# Patient Record
Sex: MALE | Race: WHITE | Employment: OTHER | URBAN - METROPOLITAN AREA
[De-identification: names, ages, dates, MRNs, and addresses within clinical notes are randomized per-mention and may not be internally consistent; named-entity substitution may affect disease eponyms.]

---

## 2019-05-10 ENCOUNTER — HOSPITAL ENCOUNTER (EMERGENCY)
Age: 54
Discharge: HOME | End: 2019-05-10
Attending: EMERGENCY MEDICINE

## 2019-05-10 ENCOUNTER — APPOINTMENT (OUTPATIENT)
Dept: ULTRASOUND IMAGING | Age: 54
End: 2019-05-10

## 2019-05-10 VITALS
TEMPERATURE: 97.9 F | WEIGHT: 190 LBS | RESPIRATION RATE: 18 BRPM | SYSTOLIC BLOOD PRESSURE: 137 MMHG | BODY MASS INDEX: 28.14 KG/M2 | OXYGEN SATURATION: 95 % | HEIGHT: 69 IN | DIASTOLIC BLOOD PRESSURE: 77 MMHG | HEART RATE: 79 BPM

## 2019-05-10 DIAGNOSIS — N45.2 ORCHITIS: Primary | ICD-10-CM

## 2019-05-10 DIAGNOSIS — N39.0 URINARY TRACT INFECTION WITHOUT HEMATURIA, SITE UNSPECIFIED: ICD-10-CM

## 2019-05-10 LAB
BACTERIA: ABNORMAL /HPF
BILIRUBIN URINE: NEGATIVE MG/DL
BLOOD, URINE: ABNORMAL
CLARITY: ABNORMAL
COLOR: YELLOW
GLUCOSE, URINE: NEGATIVE MG/DL
KETONES, URINE: NEGATIVE MG/DL
LEUKOCYTE ESTERASE, URINE: ABNORMAL
MUCUS: ABNORMAL HPF
NITRITE URINE, QUANTITATIVE: NEGATIVE
PH, URINE: 6 (ref 5–8)
PROTEIN UA: NEGATIVE MG/DL
RBC URINE: 10 /HPF (ref 0–3)
SPECIFIC GRAVITY UA: 1.01 (ref 1–1.03)
SPERM: ABNORMAL /HFP
SQUAMOUS EPITHELIAL: 2 /HPF
TRICHOMONAS: ABNORMAL /HPF
UROBILINOGEN, URINE: NORMAL MG/DL (ref 0.2–1)
WBC UA: 103 /HPF (ref 0–2)

## 2019-05-10 PROCEDURE — 76870 US EXAM SCROTUM: CPT

## 2019-05-10 PROCEDURE — 93975 VASCULAR STUDY: CPT

## 2019-05-10 PROCEDURE — 99284 EMERGENCY DEPT VISIT MOD MDM: CPT

## 2019-05-10 PROCEDURE — 81001 URINALYSIS AUTO W/SCOPE: CPT

## 2019-05-10 RX ORDER — LEVOFLOXACIN 500 MG/1
500 TABLET, FILM COATED ORAL DAILY
Qty: 14 TABLET | Refills: 0 | Status: SHIPPED | OUTPATIENT
Start: 2019-05-10 | End: 2019-05-24

## 2019-05-10 SDOH — HEALTH STABILITY: MENTAL HEALTH: HOW OFTEN DO YOU HAVE A DRINK CONTAINING ALCOHOL?: NEVER

## 2019-05-10 ASSESSMENT — PAIN DESCRIPTION - ORIENTATION: ORIENTATION: LEFT

## 2019-05-10 ASSESSMENT — PAIN DESCRIPTION - LOCATION: LOCATION: SCROTUM

## 2019-05-10 ASSESSMENT — PAIN SCALES - GENERAL: PAINLEVEL_OUTOF10: 8

## 2019-05-10 ASSESSMENT — PAIN DESCRIPTION - PAIN TYPE: TYPE: ACUTE PAIN

## 2019-05-10 NOTE — ED PROVIDER NOTES
Triage Chief Complaint:   Testicle Swelling (left sided testicle pain, states scrotum has doubled in size since Wed. with LLQ pain)    VEGA Chapa is a 62 y.o. male that presents For left testicle pain gradual in onset over the last few days is swollen, scrotum is swollen as well but he has no redness  Warmth or crepitus in this area. He has some dysuria, no history of sexually transmitted diseases or concern for them. He has no discharge. No rashes. He is not diabetic, no abdominal complaints. He's been taking sporadic over-the-counter medication with no improvement. No fevers    ROS:  General:  No fevers, no chills  Eyes:  No recent vison changes, no discharge  ENT:  No sore throat, no nasal congestion, no hearing changes  Cardiovascular:  No chest pain, no palpitations  Respiratory:  No shortness of breath, no cough, no wheezing  Gastrointestinal:  No pain, no nausea, no vomiting, no diarrhea  Musculoskeletal:  No muscle pain, no joint pain  Skin:  No rash, no pruritis, no easy bruising  Neurologic:  No speech problems, no headache  Genitourinary:  No dysuria, no hematuria, + testicular pain  Endocrine:  No unexpected weight gain, no unexpected weight loss  Extremities:  no edema, no pain    History reviewed. No pertinent past medical history. Past Surgical History:   Procedure Laterality Date    APPENDECTOMY      CHOLECYSTECTOMY      CORNEAL TRANSPLANT       History reviewed. No pertinent family history.   Social History     Socioeconomic History    Marital status: Single     Spouse name: Not on file    Number of children: Not on file    Years of education: Not on file    Highest education level: Not on file   Occupational History    Not on file   Social Needs    Financial resource strain: Not on file    Food insecurity:     Worry: Not on file     Inability: Not on file    Transportation needs:     Medical: Not on file     Non-medical: Not on file   Tobacco Use    Smoking status: Current Every Day Smoker    Smokeless tobacco: Never Used   Substance and Sexual Activity    Alcohol use: Never     Frequency: Never    Drug use: Never    Sexual activity: Yes     Partners: Female   Lifestyle    Physical activity:     Days per week: Not on file     Minutes per session: Not on file    Stress: Not on file   Relationships    Social connections:     Talks on phone: Not on file     Gets together: Not on file     Attends Zoroastrian service: Not on file     Active member of club or organization: Not on file     Attends meetings of clubs or organizations: Not on file     Relationship status: Not on file    Intimate partner violence:     Fear of current or ex partner: Not on file     Emotionally abused: Not on file     Physically abused: Not on file     Forced sexual activity: Not on file   Other Topics Concern    Not on file   Social History Narrative    Not on file     No current facility-administered medications for this encounter. Current Outpatient Medications   Medication Sig Dispense Refill    levofloxacin (LEVAQUIN) 500 MG tablet Take 1 tablet by mouth daily for 14 days 14 tablet 0     No Known Allergies    Nursing Notes Reviewed    Physical Exam:  ED Triage Vitals   Enc Vitals Group      BP 05/10/19 1335 (!) 182/87      Pulse 05/10/19 1335 83      Resp 05/10/19 1521 18      Temp 05/10/19 1335 97.9 °F (36.6 °C)      Temp Source 05/10/19 1335 Oral      SpO2 05/10/19 1335 98 %      Weight 05/10/19 1335 190 lb (86.2 kg)      Height 05/10/19 1335 5' 9\" (1.753 m)      Head Circumference --       Peak Flow --       Pain Score --       Pain Loc --       Pain Edu? --       Excl. in [address]? --        My pulse ox interpretation is - normal    General appearance:  No acute distress. Skin:  Warm. Dry. Eye:  Extraocular movements intact. Ears, nose, mouth and throat:  Oral mucosa moist   Neck:  Trachea midline.    Extremity: Normal ROM     Heart:  Regular  Perfusion:  intact  Respiratory: Testicles    Result Date: 5/10/2019  EXAMINATION: ULTRASOUND OF THE SCROTUM/TESTICLES WITH COLOR DOPPLER FLOW EVALUATION; DOPPLER EVALUATION OF THE PELVIS 5/10/2019 COMPARISON: None. HISTORY: ORDERING SYSTEM PROVIDED HISTORY: swelling TECHNOLOGIST PROVIDED HISTORY: Ordering Physician Provided Reason for Exam: left scrotal swelling Acuity: Acute Type of Exam: Initial Additional signs and symptoms: pain and redness Relevant Medical/Surgical History: nk; ORDERING SYSTEM PROVIDED HISTORY: FINDINGS: Measurements: Right testicle: 4.0 x 2.4 x 1.9 cm Left testicle: 3.4 x 2.6 x 2.1 cm Right: Grey scale: The right testicle demonstrates normal homogeneous echotexture without focal lesion. No evidence of testicular microlithiasis. Doppler Evaluation:  There is normal arterial and venous Doppler flow within the testicle. Scrotal Sac:  Small hydrocele. Epididymis:  No acute abnormality. Left: Grey scale: The left testicle demonstrates normal homogeneous echotexture without focal lesion. No evidence of testicular microlithiasis. Doppler Evaluation:  There is increased arterial and venous Doppler flow within the testicle and epididymis. Scrotal Sac: Moderate complex hydrocele present. Hydrocele. Epididymis:  Moderate diffuse enlargement with increased flow. Moderate left epididymal orchitis. Diffuse enlargement of the epididymis may indicate an underlying chronic epididymitis. Moderate complex left hydrocele. Us Dup Abd Pel Retro Scrot Complete    Result Date: 5/10/2019  EXAMINATION: ULTRASOUND OF THE SCROTUM/TESTICLES WITH COLOR DOPPLER FLOW EVALUATION; DOPPLER EVALUATION OF THE PELVIS 5/10/2019 COMPARISON: None.  HISTORY: ORDERING SYSTEM PROVIDED HISTORY: swelling TECHNOLOGIST PROVIDED HISTORY: Ordering Physician Provided Reason for Exam: left scrotal swelling Acuity: Acute Type of Exam: Initial Additional signs and symptoms: pain and redness Relevant Medical/Surgical History: nk; ORDERING SYSTEM PROVIDED HISTORY: FINDINGS: Measurements: Right testicle: 4.0 x 2.4 x 1.9 cm Left testicle: 3.4 x 2.6 x 2.1 cm Right: Grey scale: The right testicle demonstrates normal homogeneous echotexture without focal lesion. No evidence of testicular microlithiasis. Doppler Evaluation:  There is normal arterial and venous Doppler flow within the testicle. Scrotal Sac:  Small hydrocele. Epididymis:  No acute abnormality. Left: Grey scale: The left testicle demonstrates normal homogeneous echotexture without focal lesion. No evidence of testicular microlithiasis. Doppler Evaluation:  There is increased arterial and venous Doppler flow within the testicle and epididymis. Scrotal Sac: Moderate complex hydrocele present. Hydrocele. Epididymis:  Moderate diffuse enlargement with increased flow. Moderate left epididymal orchitis. Diffuse enlargement of the epididymis may indicate an underlying chronic epididymitis. Moderate complex left hydrocele. MDM:  Patient here for positive testicular swelling,Ultrasound and urine obtained, ultrasound shows orchitis and possible epididymitis, no evidence of torsion,  He does not have concern for sexually transmitted diseases, urine is positive for UTI. I'm going to start him on Levaquin, there is no concern for Johnathon's gangrene at this time, patient educated and will be discharged to monitor treat symptomatically and follow-up as an outpatient he understands and agrees return warnings given. Clinical Impression:  1. Orchitis    2.  Urinary tract infection without hematuria, site unspecified      Disposition referral (if applicable):  [phone]    In 2 days      Disposition medications (if applicable):  New Prescriptions    LEVOFLOXACIN (LEVAQUIN) 500 MG TABLET    Take 1 tablet by mouth daily for 14 days       Comment: Please note this report has been produced using speech recognition software and may contain errors related to that system including errors in grammar, punctuation, and spelling, as well as words and phrases that may be inappropriate. If there are any questions or concerns please feel free to contact the dictating provider for clarification.         Librado Royal MD  05/10/19 3811

## 2022-03-11 ENCOUNTER — OFFICE VISIT (OUTPATIENT)
Dept: INTERNAL MEDICINE CLINIC | Age: 57
End: 2022-03-11

## 2022-03-11 DIAGNOSIS — J01.40 ACUTE NON-RECURRENT PANSINUSITIS: ICD-10-CM

## 2022-03-11 DIAGNOSIS — J40 BRONCHITIS: Primary | ICD-10-CM

## 2022-03-11 PROCEDURE — 99213 OFFICE O/P EST LOW 20 MIN: CPT | Performed by: NURSE PRACTITIONER

## 2022-03-11 RX ORDER — AMOXICILLIN AND CLAVULANATE POTASSIUM 875; 125 MG/1; MG/1
1 TABLET, FILM COATED ORAL 2 TIMES DAILY
Qty: 20 TABLET | Refills: 0 | Status: SHIPPED | OUTPATIENT
Start: 2022-03-11 | End: 2022-03-21

## 2022-03-11 RX ORDER — METHYLPREDNISOLONE 4 MG/1
TABLET ORAL
Qty: 1 KIT | Refills: 0 | Status: SHIPPED | OUTPATIENT
Start: 2022-03-11 | End: 2022-03-17

## 2022-03-11 NOTE — PROGRESS NOTES
3/11/22  Carlos Eduardo MAXINE Lizcedric  1965    FLU/COVID-19 CLINIC EVALUATION    HPI SYMPTOMS:    Employer: [employer]    [x] Fevers  [x] Chills  [x] Cough  [] Coughing up blood  [x] Chest Congestion  [] Nasal Congestion  [x] Feeling short of breath  [x] Sometimes  [] Frequently  [] All the time  [] Chest pain  [x] Headaches  [x]Tolerable  [] Severe  [x] Sore throat  [x] Muscle aches  [] Nausea  [] Vomiting  []Unable to keep fluids down  [] Diarrhea  []Severe    [x] OTHER SYMPTOMS:  Loss of smell, fatigue  Symptom Duration:   [] 1  [] 2   [] 3   [x] 4    [] 5   [] 6   [] 7   [] 8   [] 9   [] 10   [] 11   [] 12   [] 13   [] 14   [] Longer than 14 days    Symptom course:   [] Worsening     [] Stable     [x] Improving    RISK FACTORS:    [] Pregnant or possibly pregnant  [] Age over 61  [] Diabetes  [] Heart disease  [x] Asthma  [] COPD/Other chronic lung diseases  [] Active Cancer  [] On Chemotherapy  [] Taking oral steroids  [] History Lymphoma/Leukemia  [] Close contact with a lab confirmed COVID-19 patient within 14 days of symptom onset  [] History of travel from affected geographical areas within 14 days of symptom onset       VITALS:  There were no vitals filed for this visit. TESTS:    POCT FLU:  [] Positive     []Negative    ASSESSMENT:    [] Flu  [] Possible COVID-19  [] Strep    PLAN:    [] Discharge home with written instructions for:  [] Flu management  [] Possible COVID-19 infection with self-quarantine and management of symptoms  [] Follow-up with primary care physician or emergency department if worsens  [] Evaluation per physician/NP/PA in clinic  [] Sent to ER       An  electronic signature was used to authenticate this note.      --Anais Saucedo MA on 3/11/2022 at 1:56 PM

## 2022-03-11 NOTE — PATIENT INSTRUCTIONS
Your COVID 19 test can take 1-5 days for the results to come back. We ask that you make a Mychart page and view your test results this way. You will need to Self quarantine until you know your results. If positive, please work on contact tracing. Increase fluids and rest  Saline nasal spray as needed for nasal congestion  Warm salt gargles as needed for throat discomfort  Monitor temperature twice a day  Tylenol as needed for fevers and/or discomfort. Big deep breaths periodically throughout the day  Regular Mucinex over the counter as needed for chest congestion  If symptoms worsen -Go to the ER. Follow up with your primary care provider      To Whom it May Concern: Luisa Rae was tested for COVID-19 3/11/2022. He/she must stay home until test results are back. If test is positive, isolate for a total of 5 days, starting from day 1 of symptom onset. After 5 days, if fever-free for 24 hours and there has been a gradual improvement in symptoms, may come out of isolation, but must consistently wear a mask when around other people for 5 additional days. (5 days isolation, 5 days mask-wearing). We do not recommend retesting as patients may continue to test positive for months even though no longer contagious. It is suggested you call [address] or [address] with any questions regarding isolation timeframe/return to work/school details.

## 2022-03-11 NOTE — PROGRESS NOTES
3/12/22    Chief Complaint   Patient presents with    Concern For 260Denzel Claudio, (1965), is a 60 y.o. male, is here for evaluation of the following medical concerns:    HPI    He is being seen today through the [facility]. PCP:  Dr. Debbie Akers    He with c/o 7 day hx of fever, chills, chest and nasal congestion with yellow/green sputum/mucus, intermittent wheezing, headache, sore throat, muscle aches. Covid vaccine x's 3.   Smoker 0.5 ppd  Denies hx of COPD    Review of Systems    Prior to Visit Medications    Not on File        No Known Allergies    No past medical history on file. Past Surgical History:   Procedure Laterality Date    APPENDECTOMY      CHOLECYSTECTOMY      CORNEAL TRANSPLANT         Social History     Tobacco Use    Smoking status: Current Every Day Smoker    Smokeless tobacco: Never Used   Substance Use Topics    Alcohol use: Never    Drug use: Never       No family history on file. No results found for: WBC, HGB, HCT, MCV, PLT  No results found for: LABA1C  No results found for: TSHFT4, TSH  No results found for: CHOL, TRIG, HDL, LDLCHOLESTEROL, LDLCALC, LABVLDL, VLDL, CHOLHDLRATIO      Results for orders placed or performed in visit on 03/11/22   COVID-19   Result Value Ref Range    SARS-CoV-2 Not Detected Not detected       Wt Readings from Last 3 Encounters:   05/10/19 190 lb (86.2 kg)     . FLOWAMB[6   BP Readings from Last 3 Encounters:   05/10/19 137/77     Pulse Readings from Last 3 Encounters:   05/10/19 79        Physical Exam  Constitutional:       General: He is not in acute distress. Appearance: Normal appearance. He is well-developed. He is not ill-appearing, toxic-appearing or diaphoretic. HENT:      Head: Normocephalic and atraumatic. Right Ear: Tympanic membrane, ear canal and external ear normal.      Left Ear: Tympanic membrane, ear canal and external ear normal.      Nose: Congestion present.       Right Sinus: Maxillary sinus tenderness and frontal sinus tenderness present. Left Sinus: Maxillary sinus tenderness and frontal sinus tenderness present. Mouth/Throat:      Mouth: Mucous membranes are moist.      Pharynx: No oropharyngeal exudate or posterior oropharyngeal erythema. Eyes:      Extraocular Movements: Extraocular movements intact. Conjunctiva/sclera: Conjunctivae normal.      Pupils: Pupils are equal, round, and reactive to light. Neck:      Thyroid: No thyroid mass or thyromegaly. Trachea: Trachea normal.   Cardiovascular:      Rate and Rhythm: Normal rate and regular rhythm. Pulses: Normal pulses. Heart sounds: S1 normal and S2 normal.   Pulmonary:      Effort: Pulmonary effort is normal. No accessory muscle usage or respiratory distress. Breath sounds: No stridor. Wheezing present. No rhonchi or rales. Musculoskeletal:         General: Normal range of motion. Right shoulder: No swelling, deformity, tenderness, bony tenderness or crepitus. Normal range of motion. Cervical back: Full passive range of motion without pain, normal range of motion and neck supple. No rigidity. No muscular tenderness. Lymphadenopathy:      Cervical: No cervical adenopathy. Skin:     General: Skin is warm and dry. Coloration: Skin is not pale. Findings: No erythema or rash. Nails: There is no clubbing. Neurological:      Mental Status: He is alert and oriented to person, place, and time. Psychiatric:         Mood and Affect: Mood normal.         Speech: Speech normal.         Behavior: Behavior normal.         Thought Content: Thought content normal.         Judgment: Judgment normal.       Pulse 79   Temp 97.9 °F (36.6 °C) (Infrared)   Resp 16   Ht 5' 9\" (1.753 m)   Wt 205 lb (93 kg)   SpO2 98%   BMI 30.27 kg/m²      ASSESSMENT AND PLAN:    1. Bronchitis  Long hx of smoking. Denies hx of COPD. Scattered wheezing noted. Oxygenating well. No distress.  Will treat with steroids. RTC or PCP if no improvement, or seek emergency care for worsening condition. Verb understanding.   - methylPREDNISolone (MEDROL DOSEPACK) 4 MG tablet; Take by mouth. Dispense: 1 kit; Refill: 0  - Covid-19 Ambulatory; Future  - COVID-19    2. Acute non-recurrent pansinusitis  Antibiotics as instructed. - Encourage clear fluids without caffeine to ensure hydration.  - Smaller, more frequent meals    - Saline nasal spray, cool mist humidifier, prop head at night for congestion.   - Tylenol as needed for fever, pain. - Counseled on signs of increased work of breathing to seek emergency treatment  - Follow-up with PCP as needed. - amoxicillin-clavulanate (AUGMENTIN) 875-125 MG per tablet; Take 1 tablet by mouth 2 times daily for 10 days  Dispense: 20 tablet; Refill: 0  - methylPREDNISolone (MEDROL DOSEPACK) 4 MG tablet; Take by mouth. Dispense: 1 kit; Refill: 0        Return if symptoms worsen or fail to improve.     TATUM Goldberg - CNP

## 2022-03-12 VITALS
HEIGHT: 69 IN | HEART RATE: 79 BPM | WEIGHT: 205 LBS | BODY MASS INDEX: 30.36 KG/M2 | OXYGEN SATURATION: 98 % | TEMPERATURE: 97.9 F | RESPIRATION RATE: 16 BRPM

## 2022-03-12 LAB — SARS-COV-2: NOT DETECTED

## 2025-08-29 ENCOUNTER — HOSPITAL ENCOUNTER (OUTPATIENT)
Dept: RADIATION ONCOLOGY | Age: 60
Discharge: HOME | End: 2025-08-29
Payer: COMMERCIAL

## 2025-08-29 VITALS
SYSTOLIC BLOOD PRESSURE: 149 MMHG | HEIGHT: 70 IN | OXYGEN SATURATION: 97 % | BODY MASS INDEX: 28.92 KG/M2 | DIASTOLIC BLOOD PRESSURE: 70 MMHG | WEIGHT: 202 LBS | TEMPERATURE: 97.5 F | HEART RATE: 70 BPM

## 2025-08-29 DIAGNOSIS — C61 MALIGNANT NEOPLASM OF PROSTATE (HCC): Primary | ICD-10-CM

## 2025-08-29 PROCEDURE — 99202 OFFICE O/P NEW SF 15 MIN: CPT | Performed by: RADIOLOGY

## 2025-08-29 RX ORDER — ATENOLOL AND CHLORTHALIDONE TABLET 50; 25 MG/1; MG/1
TABLET ORAL
COMMUNITY
Start: 2025-05-22

## 2025-08-29 RX ORDER — LOSARTAN POTASSIUM 50 MG/1
TABLET ORAL
COMMUNITY
Start: 2025-08-16

## 2025-08-29 RX ORDER — TAMSULOSIN HYDROCHLORIDE 0.4 MG/1
CAPSULE ORAL
COMMUNITY
Start: 2025-08-16

## 2025-09-05 ENCOUNTER — INITIAL CONSULT (OUTPATIENT)
Dept: ONCOLOGY | Age: 60
End: 2025-09-05
Payer: COMMERCIAL

## 2025-09-05 ENCOUNTER — HOSPITAL ENCOUNTER (OUTPATIENT)
Dept: INFUSION THERAPY | Age: 60
Discharge: HOME | End: 2025-09-05
Payer: COMMERCIAL

## 2025-09-05 ENCOUNTER — HOSPITAL ENCOUNTER (OUTPATIENT)
Dept: RADIATION ONCOLOGY | Age: 60
Discharge: HOME | End: 2025-09-05
Payer: COMMERCIAL

## 2025-09-05 VITALS
OXYGEN SATURATION: 96 % | SYSTOLIC BLOOD PRESSURE: 201 MMHG | HEART RATE: 59 BPM | WEIGHT: 203.8 LBS | TEMPERATURE: 97.7 F | HEIGHT: 70 IN | BODY MASS INDEX: 29.18 KG/M2 | DIASTOLIC BLOOD PRESSURE: 84 MMHG

## 2025-09-05 DIAGNOSIS — C61 PROSTATE CANCER (HCC): ICD-10-CM

## 2025-09-05 DIAGNOSIS — C61 PROSTATE CANCER (HCC): Primary | ICD-10-CM

## 2025-09-05 LAB
ALBUMIN SERPL-MCNC: 4.3 G/DL (ref 3.4–5)
ALBUMIN/GLOB SERPL: 1.3 {RATIO} (ref 1.1–2.2)
ALP SERPL-CCNC: 72 U/L (ref 40–129)
ALT SERPL-CCNC: 51 U/L (ref 10–40)
ANION GAP SERPL CALCULATED.3IONS-SCNC: 13 MMOL/L (ref 9–17)
AST SERPL-CCNC: 38 U/L (ref 15–37)
BASOPHILS # BLD: 0.03 K/UL
BASOPHILS NFR BLD: 0 % (ref 0–1)
BILIRUB SERPL-MCNC: 0.6 MG/DL (ref 0–1)
BUN SERPL-MCNC: 12 MG/DL (ref 7–20)
CALCIUM SERPL-MCNC: 9.8 MG/DL (ref 8.3–10.6)
CHLORIDE SERPL-SCNC: 89 MMOL/L (ref 99–110)
CHOLEST SERPL-MCNC: 186 MG/DL (ref 125–199)
CO2 SERPL-SCNC: 29 MMOL/L (ref 21–32)
CREAT SERPL-MCNC: 1 MG/DL (ref 0.8–1.3)
EOSINOPHIL # BLD: 0.22 K/UL
EOSINOPHILS RELATIVE PERCENT: 3 % (ref 0–3)
ERYTHROCYTE [DISTWIDTH] IN BLOOD BY AUTOMATED COUNT: 13.1 % (ref 11.7–14.9)
GFR, ESTIMATED: 75 ML/MIN/1.73M2
GLUCOSE SERPL-MCNC: 304 MG/DL (ref 74–99)
HCT VFR BLD AUTO: 44.6 % (ref 42–52)
HDLC SERPL-MCNC: 29 MG/DL
HGB BLD-MCNC: 15.3 G/DL (ref 13.5–18)
LDLC SERPL CALC-MCNC: 101 MG/DL
LYMPHOCYTES NFR BLD: 2.4 K/UL
LYMPHOCYTES RELATIVE PERCENT: 28 % (ref 24–44)
MAGNESIUM SERPL-MCNC: 1.7 MG/DL (ref 1.8–2.4)
MCH RBC QN AUTO: 29.7 PG (ref 27–31)
MCHC RBC AUTO-ENTMCNC: 34.3 G/DL (ref 32–36)
MCV RBC AUTO: 86.6 FL (ref 78–100)
MONOCYTES NFR BLD: 0.75 K/UL
MONOCYTES NFR BLD: 9 % (ref 0–5)
NEUTROPHILS NFR BLD: 61 % (ref 36–66)
NEUTS SEG NFR BLD: 5.23 K/UL
PLATELET # BLD AUTO: 240 K/UL (ref 140–440)
PMV BLD AUTO: 9.3 FL (ref 7.5–11.1)
POTASSIUM SERPL-SCNC: 3.2 MMOL/L (ref 3.5–5.1)
PROT SERPL-MCNC: 7.6 G/DL (ref 6.4–8.2)
RBC # BLD AUTO: 5.15 M/UL (ref 4.6–6.2)
SODIUM SERPL-SCNC: 131 MMOL/L (ref 136–145)
TRIGL SERPL-MCNC: 282 MG/DL
WBC OTHER # BLD: 8.6 K/UL (ref 4–10.5)

## 2025-09-05 PROCEDURE — 99202 OFFICE O/P NEW SF 15 MIN: CPT

## 2025-09-05 PROCEDURE — 85025 COMPLETE CBC W/AUTO DIFF WBC: CPT

## 2025-09-05 PROCEDURE — 80053 COMPREHEN METABOLIC PANEL: CPT

## 2025-09-05 PROCEDURE — 83735 ASSAY OF MAGNESIUM: CPT

## 2025-09-05 PROCEDURE — 36415 COLL VENOUS BLD VENIPUNCTURE: CPT

## 2025-09-05 PROCEDURE — 77334 RADIATION TREATMENT AID(S): CPT | Performed by: RADIOLOGY

## 2025-09-05 PROCEDURE — 80061 LIPID PANEL: CPT

## 2025-09-05 PROCEDURE — 99205 OFFICE O/P NEW HI 60 MIN: CPT | Performed by: INTERNAL MEDICINE

## 2025-09-05 RX ORDER — ABIRATERONE 500 MG/1
1000 TABLET ORAL DAILY
Qty: 60 TABLET | Refills: 1 | Status: SHIPPED | OUTPATIENT
Start: 2025-09-05